# Patient Record
Sex: FEMALE | Race: WHITE
[De-identification: names, ages, dates, MRNs, and addresses within clinical notes are randomized per-mention and may not be internally consistent; named-entity substitution may affect disease eponyms.]

---

## 2019-07-08 PROBLEM — Z00.00 ENCOUNTER FOR PREVENTIVE HEALTH EXAMINATION: Status: ACTIVE | Noted: 2019-07-08

## 2022-12-05 ENCOUNTER — APPOINTMENT (OUTPATIENT)
Dept: OPHTHALMOLOGY | Facility: CLINIC | Age: 67
End: 2022-12-05

## 2022-12-05 ENCOUNTER — NON-APPOINTMENT (OUTPATIENT)
Age: 67
End: 2022-12-05

## 2022-12-05 PROCEDURE — 92002 INTRM OPH EXAM NEW PATIENT: CPT

## 2022-12-08 ENCOUNTER — APPOINTMENT (OUTPATIENT)
Dept: OPHTHALMOLOGY | Facility: CLINIC | Age: 67
End: 2022-12-08

## 2023-01-27 ENCOUNTER — TRANSCRIPTION ENCOUNTER (OUTPATIENT)
Age: 68
End: 2023-01-27

## 2023-09-12 ENCOUNTER — APPOINTMENT (OUTPATIENT)
Dept: PULMONOLOGY | Facility: CLINIC | Age: 68
End: 2023-09-12

## 2023-10-19 ENCOUNTER — APPOINTMENT (OUTPATIENT)
Dept: PULMONOLOGY | Facility: CLINIC | Age: 68
End: 2023-10-19
Payer: MEDICARE

## 2023-10-19 PROCEDURE — 90791 PSYCH DIAGNOSTIC EVALUATION: CPT

## 2023-10-31 ENCOUNTER — APPOINTMENT (OUTPATIENT)
Dept: PULMONOLOGY | Facility: CLINIC | Age: 68
End: 2023-10-31
Payer: MEDICARE

## 2023-10-31 ENCOUNTER — NON-APPOINTMENT (OUTPATIENT)
Age: 68
End: 2023-10-31

## 2023-10-31 PROCEDURE — 90837 PSYTX W PT 60 MINUTES: CPT

## 2023-11-15 ENCOUNTER — APPOINTMENT (OUTPATIENT)
Dept: PULMONOLOGY | Facility: CLINIC | Age: 68
End: 2023-11-15
Payer: MEDICARE

## 2023-11-15 PROCEDURE — 90837 PSYTX W PT 60 MINUTES: CPT

## 2023-12-01 ENCOUNTER — APPOINTMENT (OUTPATIENT)
Dept: PULMONOLOGY | Facility: CLINIC | Age: 68
End: 2023-12-01
Payer: MEDICARE

## 2023-12-01 PROCEDURE — 90837 PSYTX W PT 60 MINUTES: CPT

## 2023-12-18 ENCOUNTER — APPOINTMENT (OUTPATIENT)
Dept: PULMONOLOGY | Facility: CLINIC | Age: 68
End: 2023-12-18
Payer: MEDICARE

## 2023-12-18 PROCEDURE — 90837 PSYTX W PT 60 MINUTES: CPT

## 2024-01-03 ENCOUNTER — APPOINTMENT (OUTPATIENT)
Dept: PULMONOLOGY | Facility: CLINIC | Age: 69
End: 2024-01-03
Payer: MEDICARE

## 2024-01-03 ENCOUNTER — APPOINTMENT (OUTPATIENT)
Dept: PULMONOLOGY | Facility: CLINIC | Age: 69
End: 2024-01-03

## 2024-01-03 PROCEDURE — 90837 PSYTX W PT 60 MINUTES: CPT | Mod: 2W

## 2024-01-04 NOTE — REASON FOR VISIT
[Home] : at home, [unfilled] , at the time of the visit. [Other Location: e.g. Home (Enter Location, City,State)___] : at [unfilled] [Patient] : the patient [Follow-Up] : a follow-up visit [FreeTextEntry1] : anxiety, insomnia

## 2024-01-04 NOTE — HISTORY OF PRESENT ILLNESS
[FreeTextEntry1] : Pt is a 67 yo F with longstanding complaints of insomnia. She has taken Seroquel in the past and currently, which helps falling asleep but less with sleep maintenance. She is interested in exploring CBTi and plans to slowly off Seroquel by April. States that menopause worsened insomnia symptoms. She took Dalmane for a short period in her 20's and more recently, Trazodone, doxepin, pregabalin, mirtazapine, and Seroquel. Of note, a split night in lab study showed mild JOHNATHAN that worsened during the brief period in REM sleep (AHI=14.3, REM AHI=54.3). CPAP titration 5 cm H2O pressure normalized breathing and improved sleep but the patient does not use CPAP at home due to difficulties tolerating CPAP. Also notable, she was dx with MDD in 2018 and successfully treated with Lexapro 20 mg qam (currently on 5 mg) and Seroquel (100 mg, tapering slowly, now on 50 mg). She has tinnitus that "comes and goes" along with musical hallucinations that additionally impact sleep. SW schedule Bed @ 10:30 pm, reads and usually asleep within 20 mins. C/o wakening frequently (up to 7xs/nt) with varying difficulty falling asleep. Gets out of bed 7:30-8 am (days) and 9-10:30 am (wkends), and estimates getting 5.5-7 hours TST. CBTi recommendations: Compress SW schedule 11:30 to 6:30 am (increased to 6:45 am and out of bed. Seroquel 50 mg @ 11pm or later. Reassurance and guidance given to tolerate short term symptoms of sleep deprivation.  cough/throat, pain

## 2024-01-04 NOTE — ASSESSMENT
[FreeTextEntry1] : Time: 3:15- 4:15 pm Spoke with Dr. Mendoza (release obtained) regarding medication, CBTi recommendations, and pt's psychiatric and medical issues associated with insomnia. Dr. Mendoza describes that possible seizure activity may impact sleep maintenance and has recommended resumption of treatment. Pt is hesitant to do so at same time as tapering Seroquel. Music hallucinations first occurred at 18 yo and dx at 20 yo, pt was on depakot for many years that controlled symptoms. Tinnitus was first experienced July 2018- describes "I knew what it was immediately and it was awful". She has been off medications for these conditions for past 5 years. 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  Pt reports that she was "shocked" when she slept until 6 am without waking up "I never do that"- discussed this is a sign of improvement. She describes that reading out of bed is not working, has tried "body scan" but currently uses a tape that is ~35 mins long and worries that it is "wasting time" for her to fall back to sleep since she needs to awaken at appointed time.  Sleep logs show bedtime current Seroquel dose is ~36 mg, 11 pm, bedtime, sleep latency 10-50 mins, ave sleep latency 15 mins, 0-6 awakenings, WASO 0-90 mins, final waketime 6:30 to 9 am, ave waketime ~7 am. TST 4.5-8.25 hours, ave TST 6.5 hours.  Discussed general signs of improvement in increased total sleep time from ~5.5 hours at consult TO 6.5 hours. Discussed possible pitfalls of continuing with sleep logs -namely the development of conditioned anxiety and self fulfilling prophecy-  pt states that when she wakes and sees the clock says 8am she thinks "I can enjoy the day" and that the sleep logs reinforce bad nights. Discussed the tendency to maximize the effect of a bad night and minimize a good night of sleep.  Practiced relaxation exercise ~13 mins- pt appeared compliant and reported that she could fall asleep at the end. Discussed using relaxation exercises during the day and prior to sleep and NOT during wake ups at this time. Rationale and protocol reviewed.  Plan Continue with SW regimen, use relaxation during daytime and prior to bedtime, continue with sleep logs for now f/u 3 wks.

## 2024-01-04 NOTE — ASSESSMENT
[FreeTextEntry1] : Time: 3:15- 4:15 pm Spoke with Dr. Mendoza (release obtained) regarding medication, CBTi recommendations, and pt's psychiatric and medical issues associated with insomnia. Dr. Mendoza describes that possible seizure activity may impact sleep maintenance and has recommended resumption of treatment. Pt is hesitant to do so at same time as tapering Seroquel. Music hallucinations first occurred at 18 yo and dx at 18 yo, pt was on depakot for many years that controlled symptoms. Tinnitus was first experienced July 2018- describes "I knew what it was immediately and it was awful". She has been off medications for these conditions for past 5 years. 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  Pt reports that she was "shocked" when she slept until 6 am without waking up "I never do that"- discussed this is a sign of improvement. She describes that reading out of bed is not working, has tried "body scan" but currently uses a tape that is ~35 mins long and worries that it is "wasting time" for her to fall back to sleep since she needs to awaken at appointed time.  Sleep logs show bedtime current Seroquel dose is ~36 mg, 11 pm, bedtime, sleep latency 10-50 mins, ave sleep latency 15 mins, 0-6 awakenings, WASO 0-90 mins, final waketime 6:30 to 9 am, ave waketime ~7 am. TST 4.5-8.25 hours, ave TST 6.5 hours.  Discussed general signs of improvement in increased total sleep time from ~5.5 hours at consult TO 6.5 hours. Discussed possible pitfalls of continuing with sleep logs -namely the development of conditioned anxiety and self fulfilling prophecy-  pt states that when she wakes and sees the clock says 8am she thinks "I can enjoy the day" and that the sleep logs reinforce bad nights. Discussed the tendency to maximize the effect of a bad night and minimize a good night of sleep.  Practiced relaxation exercise ~13 mins- pt appeared compliant and reported that she could fall asleep at the end. Discussed using relaxation exercises during the day and prior to sleep and NOT during wake ups at this time. Rationale and protocol reviewed.  Plan Continue with SW regimen, use relaxation during daytime and prior to bedtime, continue with sleep logs for now f/u 3 wks.

## 2024-01-04 NOTE — HISTORY OF PRESENT ILLNESS
[FreeTextEntry1] : Pt is a 69 yo F with longstanding complaints of insomnia. She has taken Seroquel in the past and currently, which helps falling asleep but less with sleep maintenance. She is interested in exploring CBTi and plans to slowly off Seroquel by April. States that menopause worsened insomnia symptoms. She took Dalmane for a short period in her 20's and more recently, Trazodone, doxepin, pregabalin, mirtazapine, and Seroquel. Of note, a split night in lab study showed mild JOHNATHAN that worsened during the brief period in REM sleep (AHI=14.3, REM AHI=54.3). CPAP titration 5 cm H2O pressure normalized breathing and improved sleep but the patient does not use CPAP at home due to difficulties tolerating CPAP. Also notable, she was dx with MDD in 2018 and successfully treated with Lexapro 20 mg qam (currently on 5 mg) and Seroquel (100 mg, tapering slowly, now on 50 mg). She has tinnitus that "comes and goes" along with musical hallucinations that additionally impact sleep. SW schedule Bed @ 10:30 pm, reads and usually asleep within 20 mins. C/o wakening frequently (up to 7xs/nt) with varying difficulty falling asleep. Gets out of bed 7:30-8 am (days) and 9-10:30 am (wkends), and estimates getting 5.5-7 hours TST. CBTi recommendations: Compress SW schedule 11:30 to 6:30 am (increased to 6:45 am and out of bed. Seroquel 50 mg @ 11pm or later. Reassurance and guidance given to tolerate short term symptoms of sleep deprivation.

## 2024-01-24 ENCOUNTER — APPOINTMENT (OUTPATIENT)
Dept: PULMONOLOGY | Facility: CLINIC | Age: 69
End: 2024-01-24

## 2024-01-24 ENCOUNTER — APPOINTMENT (OUTPATIENT)
Dept: PULMONOLOGY | Facility: CLINIC | Age: 69
End: 2024-01-24
Payer: MEDICARE

## 2024-01-24 PROCEDURE — 90837 PSYTX W PT 60 MINUTES: CPT | Mod: 93

## 2024-01-30 NOTE — HISTORY OF PRESENT ILLNESS
[FreeTextEntry1] : Pt is a 67 yo F with longstanding complaints of insomnia. She has taken Seroquel in the past and currently, which helps falling asleep but less with sleep maintenance. She is interested in exploring CBTi and plans to slowly off Seroquel by April. States that menopause worsened insomnia symptoms. Recalled taking Dalmane for a short period in her 20's that was difficult to get off of and more recently trialed Trazodone, doxepin, pregabalin, mirtazapine, and Seroquel. Of note, a split night in lab study showed mild JOHNATHAN that worsened during the brief period in REM sleep (AHI=14.3, REM AHI=54.3). CPAP titration 5 cm H2O pressure normalized breathing and improved sleep but the patient does not use CPAP at home due to difficulties tolerating CPAP. Also notable, she was dx with MDD in 2018 and successfully treated with Lexapro 20 mg qam (currently on 5 mg) and Seroquel ( [id number] mg, tapering slowly, now on ~25 mg). Intermittent tinnitus and musical hallucinations that additionally impact sleep. Dr. Mendoza, psychopharmacologist reportedly would like pt to trial Depakote.  SW schedule Bed @ 10:30 pm, reads and usually asleep within 20 mins. C/o wakening frequently (up to 7xs/nt) with varying difficulty falling asleep. Awake generally before alarm and gets out of bed 7:30-8 am (days) and 9-10:30 am (wkends), and estimates getting 5.5-7 hours TST. Initial CBTi recommendations: Compress SW schedule 11:30 to 6:30 am (increased to 6:45 am and out of bed. Seroquel 50 mg @ 11pm or later. Reassurance and guidance given to tolerate short term symptoms of sleep deprivation.

## 2024-01-30 NOTE — REASON FOR VISIT
[Home] : at home, [unfilled] , at the time of the visit. [Other Location: e.g. Home (Enter Location, City,State)___] : at [unfilled] [Patient] : the patient [Follow-Up] : a follow-up visit [FreeTextEntry1] : anxiety, insomnia, depression

## 2024-01-30 NOTE — PHYSICAL EXAM
[General Appearance - Well Developed] : well developed [Normal Appearance] : normal appearance [Well Groomed] : well groomed [FreeTextEntry1] : articulate, presents with mild irritability and anxiety symptoms  [Impaired Insight] : insight and judgment were intact [Affect] : the affect was normal [Mood] : the mood was normal

## 2024-01-30 NOTE — ASSESSMENT
[FreeTextEntry1] : Time: 2:00-2:55 pm Pt describes continued improvement in sleep variables; particularly falling back to sleep after awakenings and awakening less often, sometimes awakening with alarm ~7:30 am, yesterday slept till 8 am ("I never do that"). Describes she is worried about talking about it for fear that she will "jinx" her good sleep. Reassured pt that she is capable to continued good sleep and she responded that she fears "it could happen again". Discussed pt's tendency of focusing on negative outcomes that could or have happened and "bargaining" to move bedtime, earlier and waketime, later. Pt did not find relaxation exercises helpful- "I am not a good candidate".  Pt discussed prior experience at [facility]- was not encouraged to trial CBTi; pt described feeling frustrated about the prospect of remaining on medications.  Pt currently tapered to ~25 mg Seroquel. Three weeks of sleep logs show consistent bedtime of 11:15-11:30, SOL 5-30 mins, 1-3 awakenings, WASO 30-45 mins, final awakening 4-7:30 am, ave final awakening 6:45 am, TST 4-8 hrs, ave TST 6 hrs 40 mins.  Reviewed pt's sleep logs and challenged pt's concerns about poor sleep- TST of 4 hrs are rare and just as likely as 8 hrs and average TST > 6 1/2 hrs. Validated pt's hard work at following SW schedule, tapering Seroquel. Reviewed rationale and protocol for CBTi SW schedule and stimulus control instructions. Discussed d/c sleep logs- pt expressed anxiety but will try, prn. f/u 3 wks.

## 2024-01-31 NOTE — HISTORY OF PRESENT ILLNESS
[FreeTextEntry1] : Pt is a 67 yo F with longstanding complaints of insomnia. She has taken Seroquel in the past and currently, which helps falling asleep but less with sleep maintenance. She is interested in exploring CBTi and plans to slowly off Seroquel by April. States that menopause worsened insomnia symptoms. She took Dalmane for a short period in her 20's and more recently, Trazodone, doxepin, pregabalin, mirtazapine, and Seroquel. Of note, a split night in lab study showed mild JOHNATHAN that worsened during the brief period in REM sleep (AHI=14.3, REM AHI=54.3). CPAP titration 5 cm H2O pressure normalized breathing and improved sleep but the patient does not use CPAP at home due to difficulties tolerating CPAP. Also notable, she was dx with MDD in 2018 and successfully treated with Lexapro 20 mg qam (currently on 5 mg) and Seroquel (100 mg, tapering slowly, now on 50 mg). She has tinnitus that "comes and goes" along with musical hallucinations that additionally impact sleep. SW schedule Bed @ 10:30 pm, reads and usually asleep within 20 mins. C/o wakening frequently (up to 7xs/nt) with varying difficulty falling asleep. Gets out of bed 7:30-8 am (days) and 9-10:30 am (wkends), and estimates getting 5.5-7 hours TST. CBTi recommendations: Compress SW schedule 11:30 to 6:30 am (increased to 6:45 am and out of bed. Seroquel 50 mg @ 11pm or later. Reassurance and guidance given to tolerate short term symptoms of sleep deprivation.

## 2024-01-31 NOTE — PHYSICAL EXAM
[General Appearance - Well Developed] : well developed [Normal Appearance] : normal appearance [Well Groomed] : well groomed [General Appearance - Well Nourished] : well nourished [Impaired Insight] : insight and judgment were intact [Affect] : the affect was normal [Mood] : the mood was normal [FreeTextEntry1] : pleasant but anxious demeanor

## 2024-01-31 NOTE — REASON FOR VISIT
[Patient] : the patient [Follow-Up] : a follow-up visit [Home] : at home, [unfilled] , at the time of the visit. [Other Location: e.g. Home (Enter Location, City,State)___] : at [unfilled] [FreeTextEntry1] : anxiety, insomnia

## 2024-02-16 ENCOUNTER — APPOINTMENT (OUTPATIENT)
Dept: PULMONOLOGY | Facility: CLINIC | Age: 69
End: 2024-02-16
Payer: MEDICARE

## 2024-02-16 PROCEDURE — 90837 PSYTX W PT 60 MINUTES: CPT | Mod: 2W

## 2024-03-05 NOTE — ASSESSMENT
[FreeTextEntry1] : Time: 9:02-9:58 am Pt states "I'm really tired...I find it depressing." Feels her nighttime sleep is unpredictable- sometimes able to fall back to sleep and get ~6-7 hours and other times 4-6 hours. Last nt, she awakened ~4 am and did not fall back to sleep, feeling tired and frustrated this morning; indicating that if we talked yesterday she would have had a more positive outlook on treatment. Notes usually able to sleep better after a poor nts sleep but further discussion reveals that on "better nts" of sleep she may sleep later. Discussed the bidirectionality of insomnia that poor nts can also follow perceived good nts because she has delayed her waketime. Reviewed CBTi rationale of avoiding wide ranges of TST by keeping bed and wake times consistent and restricted.  Current Seroquel dose to 25 mg with plans to 1/2 dose within next week or so. Pt expresses relief about getting off of all sedative medications. Discussed that different sedative medications have different sites of action and that the research is unclear about the association between sedative medication use and neurodegenerative conditions since studies are lacking adequate control groups that include subjects on no sedatives who have chronic insufficient sleep.  Regarding SW schedule: Describes great difficulty staying up till 11:30 pm and that she is usually dozing while watching TV prior to getting into bed. Additionally, c/o noises that wake her and make falling back to sleep difficult ( out of bed for BR. snoring, radiator, tinnitus and musical hallucinations). She has not been getting out of bed during awakenings- states it wasn't helpful - moved to the couch- 1st nt- slept, second nt- didn't fall back to sleep. Noted TST for most nts with a range of 3-8 hours, ave TST 6.02 hrs, 2-4 awakenings. She sets the alarm for 7:45 am and might sleep later if she falls back to sleep but she is usually up earlier but doesn't get out of bed. We discussed that her reluctance to follow the main components of CBTi- avoid any dozing prior to sleep, restricted SW schedule (current schedule TIB -8.25 hours, ave TST-6 hours). She explains feeling so tired now that it would be difficult to reduce TIB.  Therapist discussed a need to find parts of the behavioral treatment that might work for her- focusing on anxiety during middle nt awakening- and agreed to use a "worry list" -filled out late afternoon early evening. Discussed awakening 7:30 am and keeping worry list. f/u 2-3 wks.

## 2024-03-05 NOTE — PHYSICAL EXAM
[General Appearance - Well Developed] : well developed [Normal Appearance] : normal appearance [Well Groomed] : well groomed [General Appearance - Well Nourished] : well nourished [Impaired Insight] : insight and judgment were intact [Affect] : the affect was normal [FreeTextEntry1] : articulate, forthcoming about anxiety and frustration about insomnia and daytime tiredness

## 2024-03-05 NOTE — HISTORY OF PRESENT ILLNESS
[FreeTextEntry1] : Pt is a 69 yo F with longstanding complaints of insomnia. She has taken Seroquel in the past and currently, which helps falling asleep but less with sleep maintenance. She is interested in exploring CBTi and plans to slowly off Seroquel by April. States that menopause worsened insomnia symptoms. Recalled taking Dalmane for a short period in her 20's that was difficult to get off of and more recently trialed Trazodone, doxepin, pregabalin, mirtazapine, and Seroquel. Of note, a split night in lab study showed mild JOHNATHAN that worsened during the brief period in REM sleep (AHI=14.3, REM AHI=54.3). CPAP titration 5 cm H2O pressure normalized breathing and improved sleep but the patient does not use CPAP at home due to difficulties tolerating CPAP. Also notable, she was dx with MDD in 2018 and successfully treated with Lexapro 20 mg qam (currently on 5 mg) and Seroquel ( [id number] mg, tapering slowly, now on ~25 mg). Intermittent tinnitus and musical hallucinations that additionally impact sleep. Dr. Mendoza, psychopharmacologist reportedly would like pt to trial Depakote. SW schedule Bed @ 10:30 pm, reads and usually asleep within 20 mins. C/o wakening frequently (up to 7xs/nt) with varying difficulty falling asleep. Awake generally before alarm and gets out of bed 7:30-8 am (days) and 9-10:30 am (wkends), and estimates getting 5.5-7 hours TST. Initial CBTi recommendations: Compress SW schedule 11:30 to 6:30 am (increased to 6:45 am and out of bed. Seroquel 50 mg @ 11pm or later. Reassurance and guidance given to tolerate short term symptoms of sleep deprivation.

## 2024-03-05 NOTE — REASON FOR VISIT
[Follow-Up] : a follow-up visit [Home] : at home, [unfilled] , at the time of the visit. [Other Location: e.g. Home (Enter Location, City,State)___] : at [unfilled] [Patient] : the patient [FreeTextEntry1] : anxiety, insomnia

## 2024-03-08 ENCOUNTER — APPOINTMENT (OUTPATIENT)
Dept: PULMONOLOGY | Facility: CLINIC | Age: 69
End: 2024-03-08
Payer: MEDICARE

## 2024-03-08 PROCEDURE — 90837 PSYTX W PT 60 MINUTES: CPT | Mod: 2W

## 2024-03-12 NOTE — REASON FOR VISIT
[Home] : at home, [unfilled] , at the time of the visit. [Other Location: e.g. Home (Enter Location, City,State)___] : at [unfilled] [Patient] : the patient [Follow-Up] : a follow-up visit [FreeTextEntry1] : anxiety, insomnia, sedative use

## 2024-03-12 NOTE — HISTORY OF PRESENT ILLNESS
[FreeTextEntry1] : Pt is a 67 yo F with pmhx of anxiety and MDD and longstanding complaints of insomnia. History of psychotropic medications Lexapro 10-20 mg, Seroquel 50-75 mg, Ativan 0.5-1 mg). She is interested in exploring CBTi and plans to slowly taper off Seroquel by April. States that menopause worsened insomnia symptoms. Took Dalmane briefly when in her 20's and more recently on Ativan, Trazodone, doxepin, pregabalin, and mirtazapine. Of note, a split night PSG showed mild JOHNATHAN that worsened during the brief period in REM sleep (AHI=14.3, REM AHI=54.3). CPAP titration 5 cm H2O pressure normalized breathing and improved sleep but the patient did not wish to use CPAP.   Mood hx: Dx with MDD in 2018 and successfully treated with Lexapro 20 mg qam (currently on 5 mg) and Seroquel ([id number] mg, tapering slowly, now on ~18.5 mg). Intermittent tinnitus and musical hallucinations that additionally impact sleep. Dr. Mendoza, psychopharmacologist reportedly recommended Depakote. Initial SW schedule Bed @ 10:30 pm, reads and usually asleep within 20 mins. C/o wakening frequently (up to 7xs/nt) with varying difficulty falling asleep. Awake generally before alarm and gets out of bed 7:30-8 am (days) and 9-10:30 am (wkends), and estimates getting 5.5-7 hours TST. Initial CBTi recommendations: Compress SW schedule 11:30 to 6:30 am (increased to 6:45 am and out of bed. Seroquel @ 11pm or later. Reassurance and guidance given to tolerate short term symptoms of sleep deprivation.

## 2024-03-12 NOTE — ASSESSMENT
[FreeTextEntry1] : Time: 9:02-9:58 am Reports sleep s "up and down", she is not tracking it "let it go" and the good news is: "I'm sleeping with less wake ups, use to be 2 am and 4 am but now ~4-4:30 am and one night slept through till almost alarm (can't remember when it happened last). Validated signs of improvement.  Continued pattern of several bad nights of sleep, daytime "depleted"  followed by a few good nights of sleep. Feels so different after a good night of sleep.  C/o difficulty staying up to 11-11:30 pm and dozing while reading - discussed strategies - delaying bedtime and avoiding dozing (bedtime rituals later, menial tasks, comfortable chair- sitting up, half hour TV show).  Pt concerned about daytime stressors: computer issues and needs a new MAC, downstairs neighbor complaints, health issues- wrist, tinnitus, ongoing PT.  Reviewed "worry list",  CBTi best practices. f/u 3-4 wks.

## 2024-03-12 NOTE — PHYSICAL EXAM
[General Appearance - Well Developed] : well developed [Well Groomed] : well groomed [Normal Appearance] : normal appearance [General Appearance - In No Acute Distress] : no acute distress [Impaired Insight] : insight and judgment were intact [FreeTextEntry1] : moderate anxiety symptoms manifested by ruminating thoughts about sleep and daytime fatigue

## 2024-03-26 ENCOUNTER — APPOINTMENT (OUTPATIENT)
Dept: PULMONOLOGY | Facility: CLINIC | Age: 69
End: 2024-03-26

## 2024-03-27 ENCOUNTER — APPOINTMENT (OUTPATIENT)
Dept: PULMONOLOGY | Facility: CLINIC | Age: 69
End: 2024-03-27
Payer: MEDICARE

## 2024-03-27 PROCEDURE — 90837 PSYTX W PT 60 MINUTES: CPT | Mod: 2W

## 2024-04-03 NOTE — PHYSICAL EXAM
[FreeTextEntry1] : mild irritability, mild to moderate anxiety symptoms manifested by rumination and worry about sleep, difficulty maintaining sleep, and daytime fatigue [Impaired Insight] : insight and judgment were intact

## 2024-04-03 NOTE — HISTORY OF PRESENT ILLNESS
[FreeTextEntry1] : Pt is a 67 yo F with pmhx of anxiety and MDD and longstanding complaints of insomnia. History of psychotropic medications Lexapro 10-20 mg, Seroquel 50-75 mg, Ativan 0.5-1 mg). She is interested in exploring CBTi and plans to slowly taper off Seroquel by April. States that menopause worsened insomnia symptoms. Took Dalmane briefly when in her 20's and more recently on Ativan, Trazodone, doxepin, pregabalin, and mirtazapine. Of note, a split night PSG showed mild JOHNATHAN that worsened during the brief period in REM sleep (AHI=14.3, REM AHI=54.3). CPAP titration 5 cm H2O pressure normalized breathing and improved sleep but the patient did not wish to use CPAP. Mood hx: Dx with MDD in 2018 and successfully treated with Lexapro 20 mg qam (currently on 5 mg) and Seroquel ([id number] mg, tapering slowly, now on ~18.5 mg). Intermittent tinnitus and musical hallucinations that additionally impact sleep. Dr. Mendoza, psychopharmacologist reportedly recommended Depakote. Initial SW schedule Bed @ 10:30 pm, reads and usually asleep within 20 mins. C/o wakening frequently (up to 7xs/nt) with varying difficulty falling asleep. Awake generally before alarm and gets out of bed 7:30-8 am (days) and 9-10:30 am (wkends), and estimates getting 5.5-7 hours TST. Initial CBTi recommendations: Compress SW schedule 11:30 to 6:30 am (increased to 6:45 am and out of bed. Seroquel @ 11pm or later. Reassurance and guidance given to tolerate short term symptoms of sleep deprivation

## 2024-04-03 NOTE — ASSESSMENT
[FreeTextEntry1] : Time: 4:28-5:26 pm Pt developed an eye infection that is likely related a pre-existing eye condition. She feels that her quality of life has "tanked".  She is currently decreased Seroquel to ~18 mg.  A recent CT scan was reportedly equivocal regarding seizure activity and pt will f/u with psychiatrist and neurologist.  Reports that sleep has been "horrible" and qualifies that she is generally waking up less often before 4-5 am and that on some nights has slept till alarm woke her which has never happened before stating, "I was shocked". Continues to have early morning awakenings most nts, usually btw 4-5 am and that nothing much helps her fall back to sleep when she awakens after 4-5 am- she has tried getting OOB and meditation tapes a few times. States "sometimes I'm relaxed enough" and will fall back to sleep. She finds naps are less effective at helping daytime tiredness.  Current SW schedule: reads or watches TV in living room, still dozing fairly often while watching TV, attempts to stay out of bed until ~11:30 pm, uses ear plugs, white noise to drown out radiator,  snoring. Sets alarm 7:30-7:45 am, gets out of bed a bit later. TST can vary from 3-7.5 hours and she estimates average TST ~5 hrs which she states "is not enough". Believes that if she could get at least 6 hrs, she would have a "fighting chance" for the day.  Other stressors: difficulty with downstairs neighbor and awaiting management decision, switching old MAC computer.  Discussed getting into bed 15 minutes earlier @ 11:15 pm and stricter adherence of getting out of bed if awake and if needed, can fall back to sleep on couch, reviewed other CBTi recommendations. f/u 2 wks.

## 2024-04-03 NOTE — REASON FOR VISIT
[Home] : at home, [unfilled] , at the time of the visit. [Other Location: e.g. Home (Enter Location, City,State)___] : at [unfilled] [Patient] : the patient [Follow-Up] : a follow-up visit [FreeTextEntry1] : anxiety, insomnia, sedative taper

## 2024-04-10 ENCOUNTER — APPOINTMENT (OUTPATIENT)
Dept: PULMONOLOGY | Facility: CLINIC | Age: 69
End: 2024-04-10
Payer: MEDICARE

## 2024-04-10 PROCEDURE — 90837 PSYTX W PT 60 MINUTES: CPT | Mod: 2W

## 2024-04-16 NOTE — HISTORY OF PRESENT ILLNESS
[FreeTextEntry1] : Pt is a 69 yo F with pmhx of anxiety and MDD and longstanding complaints of insomnia. History of psychotropic medications Lexapro 10-20 mg, Seroquel 50-75 mg, Ativan 0.5-1 mg). She is interested in exploring CBTi and plans to slowly taper off Seroquel by April. States that menopause worsened insomnia symptoms. Took Dalmane briefly when in her 20's and more recently on Ativan, Trazodone, doxepin, pregabalin, and mirtazapine. Of note, a split night PSG showed mild JOHNATHAN that worsened during the brief period in REM sleep (AHI=14.3, REM AHI=54.3). CPAP titration 5 cm H2O pressure normalized breathing and improved sleep but the patient did not wish to use CPAP. Mood hx: Dx with MDD in 2018 and successfully treated with Lexapro 20 mg qam (currently on 5 mg) and Seroquel ([id number] mg, tapering slowly, now on ~18.5 mg). Intermittent tinnitus and musical hallucinations that additionally impact sleep. Dr. Mendoza, psychopharmacologist reportedly recommended Depakote. Initial SW schedule Bed @ 10:30 pm, reads and usually asleep within 20 mins. C/o wakening frequently (up to 7xs/nt) with varying difficulty falling asleep. Awake generally before alarm and gets out of bed 7:30-8 am (days) and 9-10:30 am (wkends), and estimates getting 5.5-7 hours TST. Initial CBTi recommendations: Compress SW schedule 11:30 to 6:30 am (increased to 6:45 am and out of bed. Seroquel @ 11pm or later. Reassurance and guidance given to tolerate symptoms of sleep deprivation.

## 2024-04-16 NOTE — PHYSICAL EXAM
[Normal Appearance] : normal appearance [Well Groomed] : well groomed [Impaired Insight] : insight and judgment were intact [Affect] : the affect was normal [FreeTextEntry1] : moderate anxiety symptoms focused on fatigue, health conditions and insomnia

## 2024-04-16 NOTE — ASSESSMENT
[FreeTextEntry1] : Time: 4:06-5:02 pm Sleep has been "up and down"; described several health stressors - eye infection-"constant irritation", tinnitus "never going away" and music hallucinations- negatively affect sleep. Also environmental: radiator and difficulty with making noise due to downstairs neighbors' complaints. Described that health conditions have "changed me" and that she no longer wants to do international travel and that she use to travel a lot and found it "enriching". She has plans to travel within  ([place], [place] and [place]).  She notices the following positive indicators: has on occasion slept until or after alarm set to 7:30 am, tapered to 12.5 mg Seroquel, notices dreaming more often, often sleeps until 4:30 or 5:30 until first "real" awakening but has on occasion awakened ~1:30 or 2:30 am with some difficulty falling back to sleep.  Pt describes that she did not do recommendations: "worry list"- did not think it would help and it was "one more thing to do" and gets OOB at least half hour later than alarm @ 7:30 am (on Sundays @ 10 am- after SUnday morning). Discussed the challenges of our work together when pt is not adhering to CBTi recommendations. C/o of fatigue and believes that sleeping later or staying in bed helps this - therapist reviewed rationale and protocol for sleep compression. Reviewed other recommendations: worry list, more restricted bedtime 11:15 pm (pt requested 11 pm), get OOB if awake after 6 am and OOB no later than 7:30 am. f/u 2 wks.

## 2024-04-24 ENCOUNTER — APPOINTMENT (OUTPATIENT)
Dept: PULMONOLOGY | Facility: CLINIC | Age: 69
End: 2024-04-24
Payer: MEDICARE

## 2024-04-24 PROCEDURE — 90837 PSYTX W PT 60 MINUTES: CPT | Mod: 2W

## 2024-04-25 NOTE — ASSESSMENT
[FreeTextEntry1] : 2:03-2:58 pm Reports downloading "Calm" scot as per recommendation and it "worked pretty good" - has fallen back to sleep during early morning awakenings for the past 4 nights. Finds breathing parts of relaxation exercises most effective for her. Plans on getting ear buds. D/c drinking water >8 pm which might be additionally helping sleep. Pt concerned that discussion would "jinx" positive sleep effect- reassurance and education re: sleep, given. Queried re: "worry list" recommendation- pt has not done it but has found telling herself that she didn't need to think about it during an awakening and could address it tomorrow, helpful. Seroquel dose 12.5 mg- will reduce by half on May 8th.   schedule: Bed 11:15 pm, SOL <15 mins with 1-2 exceptions, 2-5 awakenings, WASO  mins, final waketime 4:40-7:45 am, ave. ~6 am, TST 4.5-7.5 hrs, ave TST ~5.5-6 hrs.  Discussion re: getting OOB soon after awakening with rationale - pt continues to be reluctant- citing extreme exhaustion and hoping to fall back to sleep to function during the day. Got OOB during awakening- read on couch, was tired and fell back to sleep but describes that sleep was "terrible" and that she would not try again.  Saw neurologist, Dr. Cunningham ~3 weeks ago and reportedly concerned about choice of PET scan and seizure medication. Pt will f/u w Dr. Mendoza.  Discussed exercise- pt evaluated at [facility] but not happy with exercise instruction- unclear and overwhelming- discussed alternative options for exercise. Reinforced CBTi best practices, 11:15 pm (pt requested 11 pm), get OOB if awake after 6 am and OOB no later than 7:30 am. f/u 3 wks.

## 2024-04-25 NOTE — PHYSICAL EXAM
[Normal Appearance] : normal appearance [Well Groomed] : well groomed [Impaired Insight] : insight and judgment were intact [FreeTextEntry1] : moderate anxiety symptoms -ruminates and worry about health conditions, fatigue and insomnia.

## 2024-04-25 NOTE — HISTORY OF PRESENT ILLNESS
[FreeTextEntry1] : Pt is a 69 yo F with pmhx of anxiety and MDD and longstanding complaints of insomnia. History of psychotropic medications Lexapro 10-20 mg, Seroquel 50-75 mg, Ativan 0.5-1 mg). She is interested in exploring CBTi and plans to slowly taper off Seroquel by April. States that menopause worsened insomnia symptoms. Took Dalmane briefly when in her 20's and more recently on Ativan, Trazodone, doxepin, pregabalin, and mirtazapine. Of note, a split night PSG showed mild JOHNATHAN that worsened during the brief period in REM sleep (AHI=14.3, REM AHI=54.3). CPAP titration 5 cm H2O pressure normalized breathing and improved sleep but the patient did not wish to use CPAP. Mood hx: Dx with MDD in 2018 and successfully treated with Lexapro 20 mg qam (currently on 5 mg) and Seroquel ([id number] mg, tapering slowly, now on ~12.5 mg). Intermittent tinnitus and musical hallucinations that additionally impact sleep. Dr. Mendoza, psychopharmacologist reportedly recommended Depakote. Initial SW schedule Bed @ 10:30 pm, reads and usually asleep within 20 mins. C/o wakening frequently (up to 7xs/nt) with varying difficulty falling asleep. Awake generally before alarm and gets out of bed 7:30-8 am (days) and 9-10:30 am (wkends), and estimates getting 5.5-7 hours TST. Initial CBTi recommendations: Compress SW schedule 11:30 to 6:30 am (increased to 6:45 am and out of bed. Seroquel @ 11pm or later. Pt reluctant to decrease TIB citing sleep deprivation symptoms are intolerable, current SW schedule 11:15pm-7:30 am with improvement in sleep continuity and falling back to sleep.

## 2024-05-15 ENCOUNTER — APPOINTMENT (OUTPATIENT)
Dept: PULMONOLOGY | Facility: CLINIC | Age: 69
End: 2024-05-15
Payer: MEDICARE

## 2024-05-15 PROCEDURE — 90837 PSYTX W PT 60 MINUTES: CPT | Mod: 2W

## 2024-05-19 NOTE — ASSESSMENT
[FreeTextEntry1] : Time: 3:05-3:59 pm Reports "been a little trung". States "bad news" waking up earlier and unable to get back to sleep. Has tried "reading and meditation", sometimes falls back to sleep or just rests and "reyes through the day". "Good news" is she had 4 nights consecutive good sleep. Traveled to [place] and reports sleeping well. Reported correspondence with neurologist and discussion about possible medication- pt states she does not want to take any sedative; "non-negotiable" Cites this morning, woke up early and finally fell back to sleep, getting out of bed ~8-8:30am. Pt recently d/c Seroquel.  States she realized that she is afraid of traveling because of insomnia. Productive discussion regarding CBTi basics and that changing behaviors can be challenging and difficult- focus on getting out of bed in middle of night to "reset" and to be awake doing non-stimulating things for an hour (e.g., relaxation, reading, calm music or TV) and decreasing arousal, anxiety and frustration about middle night wakings.  Reinforced CBTi best practices, 11:15 pm (pt requested 11 pm), get OOB if awake, final wake 7:30 am. f/u 2-3 wks.

## 2024-05-19 NOTE — ASSESSMENT
[FreeTextEntry1] : Time: 3:05-3:59 pm Reports "been a little trung". States "bad news" waking up earlier and unable to get back to sleep. Has tried "reading and meditation", sometimes falls back to sleep or just rests and "reyes through the day". "Good news" is she had 4 nights consecutive good sleep. Traveled to [place] and reports sleeping well. Reported correspondence with neurologist and discussion about possible medication- pt states she does not want to take any sedative; "non-negotiable" Cites this morning, woke up early and finally fell back to sleep, getting out of bed ~8-8:30am. Pt recently d/c Seroquel.  States she realized that she is afraid of traveling because of insomnia. Productive discussion regarding CBTi basics and that changing behaviors can be challenging and difficult- focus on getting out of bed in middle of night to "reset" and to be awake doing non-stimulating things for an hour (e.g., relaxation, reading, calm music or TV) and decreasing arousal, anxiety and frustration about middle night wakings.  Reinforced CBTi best practices, 11:15 pm (pt requested 11 pm), get OOB if awake, final wake 7:30 am. f/u 2-3 wks.

## 2024-05-19 NOTE — HISTORY OF PRESENT ILLNESS
[FreeTextEntry1] : Pt is a 69 yo F with pmhx of anxiety and MDD and longstanding complaints of insomnia. History of psychotropic medications Lexapro 10-20 mg, Seroquel 50-75 mg, Ativan 0.5-1 mg). She is interested in exploring CBTi and plans to slowly taper off Seroquel by April. States that menopause worsened insomnia symptoms. Took Dalmane briefly when in her 20's and more recently on Ativan, Trazodone, doxepin, pregabalin, and mirtazapine. Of note, a split night PSG showed mild JOHNATHAN that worsened during the brief period in REM sleep (AHI=14.3, REM AHI=54.3). CPAP titration 5 cm H2O pressure normalized breathing and improved sleep but the patient did not wish to use CPAP. Mood hx: Dx with MDD in 2018 and successfully treated with Lexapro 20 mg qam (currently on 5 mg) and Seroquel ([id number] mg, tapering slowly, now on ~12.5 mg). Intermittent tinnitus and musical hallucinations that additionally impact sleep. Dr. Mendoza, psychopharmacologist reportedly recommended Depakote. Initial SW schedule Bed @ 10:30 pm, reads and usually asleep within 20 mins. C/o wakening frequently (up to 7xs/nt) with varying difficulty falling asleep. Awake generally before alarm and gets out of bed 7:30-8 am (days) and 9-10:30 am (wkends), and estimates getting 5.5-7 hours TST. Initial CBTi recommendations: Compress SW schedule 11:30 to 6:30 am (increased to 6:45 am and out of bed. Seroquel @ 11pm or later. Pt reluctant to decrease TIB citing sleep deprivation symptoms are intolerable, current SW schedule 11:15pm-7:30 am with improvement in sleep continuity and falling back to sleep.

## 2024-05-19 NOTE — HISTORY OF PRESENT ILLNESS
[FreeTextEntry1] : Pt is a 67 yo F with pmhx of anxiety and MDD and longstanding complaints of insomnia. History of psychotropic medications Lexapro 10-20 mg, Seroquel 50-75 mg, Ativan 0.5-1 mg). She is interested in exploring CBTi and plans to slowly taper off Seroquel by April. States that menopause worsened insomnia symptoms. Took Dalmane briefly when in her 20's and more recently on Ativan, Trazodone, doxepin, pregabalin, and mirtazapine. Of note, a split night PSG showed mild JOHNATHAN that worsened during the brief period in REM sleep (AHI=14.3, REM AHI=54.3). CPAP titration 5 cm H2O pressure normalized breathing and improved sleep but the patient did not wish to use CPAP. Mood hx: Dx with MDD in 2018 and successfully treated with Lexapro 20 mg qam (currently on 5 mg) and Seroquel ([id number] mg, tapering slowly, now on ~12.5 mg). Intermittent tinnitus and musical hallucinations that additionally impact sleep. Dr. Mendoza, psychopharmacologist reportedly recommended Depakote. Initial SW schedule Bed @ 10:30 pm, reads and usually asleep within 20 mins. C/o wakening frequently (up to 7xs/nt) with varying difficulty falling asleep. Awake generally before alarm and gets out of bed 7:30-8 am (days) and 9-10:30 am (wkends), and estimates getting 5.5-7 hours TST. Initial CBTi recommendations: Compress SW schedule 11:30 to 6:30 am (increased to 6:45 am and out of bed. Seroquel @ 11pm or later. Pt reluctant to decrease TIB citing sleep deprivation symptoms are intolerable, current SW schedule 11:15pm-7:30 am with improvement in sleep continuity and falling back to sleep.

## 2024-05-31 ENCOUNTER — APPOINTMENT (OUTPATIENT)
Dept: PULMONOLOGY | Facility: CLINIC | Age: 69
End: 2024-05-31
Payer: MEDICARE

## 2024-05-31 PROCEDURE — 90837 PSYTX W PT 60 MINUTES: CPT | Mod: 2W

## 2024-06-04 NOTE — HISTORY OF PRESENT ILLNESS
[FreeTextEntry1] : Pt is a 67 yo F with pmhx of anxiety and MDD and longstanding complaints of insomnia. History of psychotropic medications Lexapro 10-20 mg, Seroquel 50-75 mg, Ativan 0.5-1 mg). She is interested in exploring CBTi and plans to slowly taper off Seroquel by April. States that menopause worsened insomnia symptoms. Took Dalmane briefly when in her 20's and more recently on Ativan, Trazodone, doxepin, pregabalin, and mirtazapine. Of note, a split night PSG showed mild JOHNATHAN that worsened during the brief period in REM sleep (AHI=14.3, REM AHI=54.3). CPAP titration 5 cm H2O pressure normalized breathing and improved sleep but the patient did not wish to use CPAP. Mood hx: Dx with MDD in 2018 and successfully treated with Lexapro 20 mg qam (currently on 5 mg) and Seroquel ([id number] mg, tapering slowly, now on ~12.5 mg). Intermittent tinnitus and musical hallucinations that additionally impact sleep. Dr. Mendoza, psychopharmacologist reportedly recommended Depakote. Initial SW schedule Bed @ 10:30 pm, reads and usually asleep within 20 mins. C/o wakening frequently (up to 7xs/nt) with varying difficulty falling asleep. Awake generally before alarm and gets out of bed 7:30-8 am (days) and 9-10:30 am (wkends), and estimates getting 5.5-7 hours TST. Initial CBTi recommendations: Compress SW schedule 11:30 to 6:30 am (increased to 6:45 am and out of bed. Seroquel @ 11pm or later. Pt reluctant to decrease TIB citing sleep deprivation symptoms are intolerable, current SW schedule 11:15pm-7:30 am with improvement in sleep continuity and falling back to sleep.

## 2024-06-04 NOTE — PHYSICAL EXAM
[Normal Appearance] : normal appearance [Well Groomed] : well groomed [FreeTextEntry1] : moderate anxiety symptoms manifested by rumination and worry about insomnia, fatigue and health conditions

## 2024-06-04 NOTE — ASSESSMENT
[FreeTextEntry1] : Time: 9:31-10:27 am Pt reports that she is discouraged about her sleep and that she believes it is getting worse. She notices waking even earlier, sometimes after ~2-3 hours of sleep, usually able to get back to sleep but may awaken again after 4-5 am, with difficulty getting back to sleep. She reports that getting out of bed and doing something (reading, meditating, TV) is not helpful but reading may help the most.  Reports being quite fatigued during the day, usually tired and may doze briefly prior to bed @ 11:15 pm, usually falls asleep without problem, but awakens multiples times- feels sleep is light and not adequate. She reports that since beginning CBTi, her sleep has not improved. Therapist pointed out that there have been several positive changes in this time: d/c Seroquel, falling back to sleep on some nights and sleeping until 7-8 am on occasions. Challenges in therapy were additionally discussed: she has not wanted to follow the typical sleep restriction guidelines so that we loosened the protocol and that mild JOHNATHAN may be impacting sleep continuity. Recommended scheduling an HST at [facility]. Pt was frustrated and upset by this. Therapist suggested the possibility of considering other therapies that might be more helpful. Pt became noticeably more upset and repeated that this was the worst suggestion - we spent much of the remainder of the session attempting to reconcile and resolve this misunderstanding that left no time for therapeutic recommendations. f/u TBD.

## 2024-06-19 ENCOUNTER — APPOINTMENT (OUTPATIENT)
Dept: PULMONOLOGY | Facility: CLINIC | Age: 69
End: 2024-06-19
Payer: MEDICARE

## 2024-06-19 PROCEDURE — 90837 PSYTX W PT 60 MINUTES: CPT | Mod: 2W

## 2024-06-22 NOTE — ASSESSMENT
[FreeTextEntry1] : Time: 9:36-10:31 am Reports "I'm ok." Sleep has been "pretty erratic", fluctuates between getting more sleep than I have in a very long time (citing several episodes of sleeping ~ 8 hours) and more nights of getting only 4-5 hours sleep (early morning awakenings).  States typical scenario: reads till 11:15 out of bed, gets into bed and falls asleep ~10 mins, usually wakes ~2-4 times per night, sometimes nocturia, sometimes out of bed to read if longer and awake, sometimes falls back to sleep "if rogelio". Notices that she is more likely to fall back to sleep if she feels "half asleep" and not fully awake.  Taking more naps (brief, <3 pm). Traveling recently, fell asleep on plane and slept better once home after sleeping poorly in hotel. Saw ophthalmologist specialist and reviewed possible protocol- feels anxious about treatment phases. May try Vimpat for seizure condition. Reviewed "calm" scot and other mindfulness/relaxation exercises to support winding down prior to getting back into bed. Discussed that variability in sleep indicates that a more sleep restricted schedule could be helpful- pt not receptive at this time. Reviewed CBTi recommendations, will send relaxation joseph, f/u 4 wks.

## 2024-06-22 NOTE — PHYSICAL EXAM
[Normal Appearance] : normal appearance [Impaired Insight] : insight and judgment were intact [FreeTextEntry1] : moderate anxiety symptoms manifested by rumination and worry about insomnia, fatigue and health conditions.

## 2024-06-22 NOTE — HISTORY OF PRESENT ILLNESS
[FreeTextEntry1] : Pt is a 67 yo F with pmhx of anxiety and MDD and longstanding complaints of insomnia. History of psychotropic medications Lexapro 10-20 mg, Seroquel 50-75 mg, Ativan 0.5-1 mg). Treatment has been CBTi and through the course of therapy has successfully tapered off Seroquel with her psychiatrist's guidance. She took Dalmane briefly when in her 20's and more recently Ativan, Trazodone, doxepin, pregabalin, and mirtazapine. Of note, a split night PSG showed mild JOHNATHAN that worsened during the brief period in REM sleep (AHI=14.3, REM AHI=54.3). CPAP titration 5 cm H2O pressure normalized breathing and improved sleep, but the patient did not wish to use CPAP. Mood hx: Dx with MDD in 2018 and successfully treated with Lexapro 20 mg qam (currently on 5 mg) and Seroquel ([id number] mg, with a slow taper. Intermittent tinnitus and musical hallucinations that additionally impact sleep. Dr. Mendoza, psychopharmacologist reportedly recommended Depakote. Initial SW schedule Bed @ 10:30 pm, reads and usually asleep within 20 mins. C/o wakening frequently (up to 7xs/nt) with varying difficulty falling asleep. Awake generally before alarm and gets out of bed 7:30-8 am (days) and 9-10:30 am (wkends), and estimates getting 5.5-7 hours TST. Initial CBTi recommendations: Compress SW schedule 11:30 to 6:30 am (increased to 6:45 am and out of bed. Seroquel @ 11pm or later. Pt reluctant to decrease TIB citing sleep deprivation symptoms are intolerable, current SW schedule 11:15pm-7:30 am with improvement in sleep continuity and falling back to sleep. Triggers for insomnia include a hx of insomnia, more recent depressive episodes and medications, REM-related JOHNATHAN, menopause and neurologic conditions.

## 2024-07-19 ENCOUNTER — APPOINTMENT (OUTPATIENT)
Dept: PULMONOLOGY | Facility: CLINIC | Age: 69
End: 2024-07-19

## 2024-09-27 ENCOUNTER — APPOINTMENT (OUTPATIENT)
Dept: PULMONOLOGY | Facility: CLINIC | Age: 69
End: 2024-09-27

## 2024-09-27 PROCEDURE — 90837 PSYTX W PT 60 MINUTES: CPT | Mod: 93

## 2024-10-02 NOTE — HISTORY OF PRESENT ILLNESS
[FreeTextEntry1] : Pt is a 67 yo F with pmhx of anxiety and MDD and longstanding complaints of insomnia. History of psychotropic medications Lexapro 10-20 mg, Seroquel 50-75 mg, Ativan 0.5-1 mg). Treatment has been CBTi and through the course of therapy has successfully tapered off Seroquel with her psychiatrist's guidance. She took Dalmane briefly when in her 20's and more recently Ativan, Trazodone, doxepin, pregabalin, and mirtazapine. Of note, a split night PSG showed mild JOHNATHAN that worsened during the brief period in REM sleep (AHI=14.3, REM AHI=54.3). CPAP titration 5 cm H2O pressure normalized breathing and improved sleep, but the patient did not wish to use CPAP. Mood hx: Dx with MDD in 2018 and successfully treated with Lexapro 20 mg qam (currently on 5 mg) and Seroquel ([id number] mg, with a slow taper, d/c two months ago). Intermittent tinnitus and musical hallucinations that additionally impact sleep. Dr. Mendoza, psychopharmacologist reportedly recommended Depakote. Initial SW schedule Bed @ 10:30 pm, reads and usually asleep within 20 mins. C/o wakening frequently (up to 7xs/nt) with varying difficulty falling asleep. Awake generally before alarm and gets out of bed 7:30-8 am (days) and 9-10:30 am (wkends), and estimates getting 5.5-7 hours TST. Initial CBTi recommendations: Compress SW schedule 11:30 to 6:30 am (increased to 6:45 am and out of bed. Seroquel @ 11pm or later. Pt reluctant to decrease TIB citing sleep deprivation symptoms are intolerable, current SW schedule 11:15pm-7:30 am with some improvement in sleep continuity and falling back to sleep. Triggers for insomnia include a hx of insomnia, more recent depressive episodes and medications, REM-related JOHNATHAN, menopause and neurologic conditions.

## 2024-10-02 NOTE — ASSESSMENT
[FreeTextEntry1] : Time: 2:00-2:54 pm Reports that she had a very good trip with "ups and downs" but that she was "very glad" that she did it. States that it was a "reset". States that before leaving for trip, her primary concern was sleep and that it was the first time she had traveled without meds. Said that "all things considered, it was not as bad... surprised how much I slept."   Noticed on trip and since home, she is waking less often and falls back to sleep more often than before trip. Noted while in [place], she was very active walking and stimulated by sites, would sometimes come back to her room and take a nap or just rest before going out again.  Typical schedule: bed 11:15-11:30 pm, wake 2-3xs, final wake 7-8 am. Began taking Vimpact 50 mg as per Dr. Mendoza. States she has been on similar anti-seizure medications since her 20's but d/c during breast cancer txmt.  States "reset" since trip includes being less reactive and trying to take the high ground in social interactions.  Discussed decreasing TIB now that she is acclimated to time zone 11:15-7 am. Mindfulness, meditation practice, continue with increased activity outdoors. f/u 4 weeks.

## 2024-10-02 NOTE — PHYSICAL EXAM
[Oriented To Time, Place, And Person] : oriented to person, place, and time [Impaired Insight] : insight and judgment were intact [Affect] : the affect was normal [Mood] : the mood was normal [FreeTextEntry1] : improvement in anxiety, continued focus on insomnia symptoms

## 2024-11-01 ENCOUNTER — APPOINTMENT (OUTPATIENT)
Dept: PULMONOLOGY | Facility: CLINIC | Age: 69
End: 2024-11-01

## 2024-11-15 ENCOUNTER — APPOINTMENT (OUTPATIENT)
Dept: PULMONOLOGY | Facility: CLINIC | Age: 69
End: 2024-11-15

## 2024-11-15 PROCEDURE — 90837 PSYTX W PT 60 MINUTES: CPT | Mod: 2W

## 2024-12-13 ENCOUNTER — APPOINTMENT (OUTPATIENT)
Dept: PULMONOLOGY | Facility: CLINIC | Age: 69
End: 2024-12-13

## 2024-12-13 PROCEDURE — 90837 PSYTX W PT 60 MINUTES: CPT | Mod: 93

## 2024-12-16 ENCOUNTER — NON-APPOINTMENT (OUTPATIENT)
Age: 69
End: 2024-12-16

## 2024-12-16 ENCOUNTER — APPOINTMENT (OUTPATIENT)
Dept: PULMONOLOGY | Facility: CLINIC | Age: 69
End: 2024-12-16

## 2024-12-16 PROCEDURE — 90837 PSYTX W PT 60 MINUTES: CPT | Mod: 2W
